# Patient Record
Sex: FEMALE | Race: WHITE | Employment: UNEMPLOYED | URBAN - METROPOLITAN AREA
[De-identification: names, ages, dates, MRNs, and addresses within clinical notes are randomized per-mention and may not be internally consistent; named-entity substitution may affect disease eponyms.]

---

## 2017-07-19 ENCOUNTER — OFFICE VISIT (OUTPATIENT)
Dept: URGENT CARE | Facility: URGENT CARE | Age: 32
End: 2017-07-19
Payer: COMMERCIAL

## 2017-07-19 VITALS
BODY MASS INDEX: 21.96 KG/M2 | TEMPERATURE: 97.6 F | OXYGEN SATURATION: 99 % | DIASTOLIC BLOOD PRESSURE: 70 MMHG | WEIGHT: 128 LBS | SYSTOLIC BLOOD PRESSURE: 105 MMHG | HEART RATE: 67 BPM

## 2017-07-19 DIAGNOSIS — L03.90 CELLULITIS, UNSPECIFIED CELLULITIS SITE: Primary | ICD-10-CM

## 2017-07-19 PROCEDURE — 99213 OFFICE O/P EST LOW 20 MIN: CPT | Performed by: FAMILY MEDICINE

## 2017-07-19 NOTE — MR AVS SNAPSHOT
"              After Visit Summary   7/19/2017    Linda Michel    MRN: [id number]           Patient Information     Date Of Birth          1985        Visit Information        Provider Department      7/19/2017 1:30 PM Jay Frazier MD [facility]        Today's Diagnoses     Cellulitis, unspecified cellulitis site    -  1       Follow-ups after your visit        Future tests that were ordered for you today     Open Future Orders        Priority Expected Expires Ordered    **Lyme Disease Perla with reflex to WB Serum FUTURE 14d Routine 7/26/2017 8/2/2017 7/19/2017            Who to contact     If you have questions or need follow up information about today's clinic visit or your schedule please contact [facility] directly at [phone].  Normal or non-critical lab and imaging results will be communicated to you by MyChart, letter or phone within 4 business days after the clinic has received the results. If you do not hear from us within 7 days, please contact the clinic through MyChart or phone. If you have a critical or abnormal lab result, we will notify you by phone as soon as possible.  Submit refill requests through [facility] or call your pharmacy and they will forward the refill request to us. Please allow 3 business days for your refill to be completed.          Additional Information About Your Visit        MyChart Information     H?REL lets you send messages to your doctor, view your test results, renew your prescriptions, schedule appointments and more. To sign up, go to [email or web address] . Click on \"Log in\" on the left side of the screen, which will take you to the Welcome page. Then click on \"Sign up Now\" on the right side of the page.     You will be asked to enter the access code listed below, as well as some personal information. Please follow the directions to create your username and password.     Your access code is: " [email or web address]  Expires: 10/17/2017  2:20 PM     Your access code will  in 90 days. If you need help or a new code, please call your [facility] or [phone].        Care EveryWhere ID     This is your Care EveryWhere ID. This could be used by other organizations to access your [place] medical records  [id number]        Your Vitals Were     Pulse Temperature Pulse Oximetry BMI (Body Mass Index)          67 97.6  F (36.4  C) (Oral) 99% 21.96 kg/m2         Blood Pressure from Last 3 Encounters:   17 105/70   08/10/16 (!) 140/98   16 (!) 145/100    Weight from Last 3 Encounters:   17 128 lb (58.1 kg)   16 124 lb 1.9 oz (56.3 kg)   16 125 lb (56.7 kg)                 Today's Medication Changes          These changes are accurate as of: 17  2:20 PM.  If you have any questions, ask your nurse or doctor.               Start taking these medicines.        Dose/Directions    amoxicillin-clavulanate 875-125 MG per tablet   Commonly known as:  AUGMENTIN   Used for:  Cellulitis, unspecified cellulitis site   Started by:  Jay Frazier MD        Dose:  1 tablet   Take 1 tablet by mouth 2 times daily   Quantity:  20 tablet   Refills:  0            Where to get your medicines      These medications were sent to [facility] [address] [place], [place] [address] AT [facility]   [address], [facility] [address]    Hours:  24-hours Phone:  [phone]     amoxicillin-clavulanate 875-125 MG per tablet                Primary Care Provider Office Phone # Fax #    [facility] [phone] [address] [facility] [address]        Equal Access to Services     TERRIE BEDOLLA AH: Hadii aad ku hadasho Soomaali, waaxda luqadaha, qaybta kaalmada rochelle, christoph ugalde ah. So [facility] [phone].    ATENCIÓN: Si habla español, tiene a esteban disposición servicios gratuitos de  asistencia lingüística. Chet al [phone].    We comply with applicable federal civil rights laws and [place] laws. We do not discriminate on the basis of race, color, national origin, age, disability sex, sexual orientation or gender identity.            Thank you!     Thank you for choosing [facility]  for your care. Our goal is always to provide you with excellent care. Hearing back from our patients is one way we can continue to improve our services. Please take a few minutes to complete the written survey that you may receive in the mail after your visit with us. Thank you!             Your Updated Medication List - Protect others around you: Learn how to safely use, store and throw away your medicines at www.disposemymeds.org.          This list is accurate as of: 7/19/17  2:20 PM.  Always use your most recent med list.                   Brand Name Dispense Instructions for use Diagnosis    amLODIPine 5 MG tablet    NORVASC    30 tablet    Take 1 tablet (5 mg) by mouth daily    Essential hypertension with goal blood pressure less than 130/85       amoxicillin-clavulanate 875-125 MG per tablet    AUGMENTIN    20 tablet    Take 1 tablet by mouth 2 times daily    Cellulitis, unspecified cellulitis site       disulfiram 250 MG tablet    ANTABUSE    30 tablet    Take 1 tablet (250 mg) by mouth daily    Alcohol abuse       folic acid 1 MG tablet    FOLVITE    90 tablet    Take 1 tablet (1 mg) by mouth daily    Chronic alcohol abuse       * multivitamin, therapeutic with minerals Tabs tablet     90 each    Take 1 tablet by mouth daily    Chronic alcohol abuse       * multivitamin, therapeutic with minerals Tabs tablet     30 each    Take 1 tablet by mouth daily    Alcohol abuse       ORTHO TRI-CYCLEN LO 0.18/0.215/0.25 MG-25 MCG per tablet   Generic drug:  norgestim-eth estrad triphasic      Take 1 tablet by mouth daily        thiamine 100 MG tablet     30 tablet    Take 1 tablet  (100 mg) by mouth daily    Alcohol abuse       * Notice:  This list has 2 medication(s) that are the same as other medications prescribed for you. Read the directions carefully, and ask your doctor or other care provider to review them with you.

## 2017-07-19 NOTE — NURSING NOTE
"Chief Complaint   Patient presents with     Insect Bite     spider bite on left thigh.       Initial /70  Pulse 67  Temp 97.6  F (36.4  C) (Oral)  Wt 128 lb (58.1 kg)  SpO2 99%  BMI 21.96 kg/m2 Estimated body mass index is 21.96 kg/(m^2) as calculated from the following:    Height as of 8/8/16: 5' 4.02\" (1.626 m).    Weight as of this encounter: 128 lb (58.1 kg).  Medication Reconciliation: complete    "

## 2017-07-19 NOTE — PROGRESS NOTES
SUBJECTIVE:  Linda Michel, a 31 year old female scheduled an appointment to discuss the following issues:  Cellulitis, unspecified cellulitis site; 4 days of increasing redness and  Pain . Central area on her lt thigh with possible spider bite. Does not remember tick.    No fever and no chills. No SOB    No joint pain    Medical, social, surgical, and family histories reviewed.    ROS:  CONSTITUTIONAL:as above  INTEGUMENTARY/SKIN: as above  E: NEGATIVE for vision changes   R: NEGATIVE for significant cough or SOB  CV: NEGATIVE for chest pain, palpitations   GI: NEGATIVE for nausea, abdominal pain, heartburn, or change in bowel habits  : NEGATIVE for frequency, dysuria, or hematuria  M: NEGATIVE for significant arthralgias or myalgia  N: NEGATIVE for weakness, dizziness or paresthesias or headache    OBJECTIVE:  /70  Pulse 67  Temp 97.6  F (36.4  C) (Oral)  Wt 128 lb (58.1 kg)  SpO2 99%  BMI 21.96 kg/m2  EXAM:  GENERAL APPEARANCE: healthy, active and no distress  EYES: EOMI,  PERRL    RESP: lungs clear to auscultation - no rales, rhonchi or wheezes    ABDOMEN:  soft, nontender, no HSM or masses and bowel sounds normal  SKIN: lt anterior thigh. Mid thigh there is rednessand swelling surrounding a central core . Diameter of about 10 cm. Some medial clearing like target lesion.  MS; normal  ASSESSMENT/PLAN:  (L03.90) Cellulitis, unspecified cellulitis site  (primary encounter diagnosis)  Comment:     Plan: amoxicillin-clavulanate (AUGMENTIN) 875-125 MG         per tablet, **Lyme Disease Perla with reflex to         WB Serum FUTURE 14d            I suspect insect bite :i.e spider Not thinking this is tick bite but there is some hint of a target sign.    There is no evidence of joint involvment and not systemically ill.  No arthralgias..     See orders    Septra for 10 days  2 weeks and will have Lymes titer.

## 2018-02-06 ENCOUNTER — HOSPITAL ENCOUNTER (EMERGENCY)
Facility: CLINIC | Age: 33
Discharge: HOME | End: 2018-02-06
Attending: EMERGENCY MEDICINE | Admitting: EMERGENCY MEDICINE
Payer: COMMERCIAL

## 2018-02-06 ENCOUNTER — APPOINTMENT (OUTPATIENT)
Dept: GENERAL RADIOLOGY | Facility: CLINIC | Age: 33
End: 2018-02-06
Attending: EMERGENCY MEDICINE
Payer: COMMERCIAL

## 2018-02-06 VITALS
OXYGEN SATURATION: 97 % | HEART RATE: 110 BPM | DIASTOLIC BLOOD PRESSURE: 104 MMHG | RESPIRATION RATE: 18 BRPM | SYSTOLIC BLOOD PRESSURE: 144 MMHG | TEMPERATURE: 98.1 F

## 2018-02-06 DIAGNOSIS — F10.220 ACUTE ALCOHOLIC INTOXICATION IN ALCOHOLISM WITHOUT COMPLICATION (H): ICD-10-CM

## 2018-02-06 DIAGNOSIS — F41.9 ANXIETY: ICD-10-CM

## 2018-02-06 LAB
ANION GAP SERPL CALCULATED.3IONS-SCNC: 8 MMOL/L (ref 3–14)
BUN SERPL-MCNC: 8 MG/DL (ref 7–30)
CALCIUM SERPL-MCNC: 8.2 MG/DL (ref 8.5–10.1)
CHLORIDE SERPL-SCNC: 109 MMOL/L (ref 94–109)
CO2 SERPL-SCNC: 28 MMOL/L (ref 20–32)
CREAT SERPL-MCNC: 0.54 MG/DL (ref 0.52–1.04)
ETHANOL SERPL-MCNC: 0.34 G/DL
GFR SERPL CREATININE-BSD FRML MDRD: >90 ML/MIN/1.7M2
GLUCOSE SERPL-MCNC: 95 MG/DL (ref 70–99)
INTERPRETATION ECG - MUSE: NORMAL
POTASSIUM SERPL-SCNC: 3.5 MMOL/L (ref 3.4–5.3)
SODIUM SERPL-SCNC: 145 MMOL/L (ref 133–144)

## 2018-02-06 PROCEDURE — 25000128 H RX IP 250 OP 636: Performed by: EMERGENCY MEDICINE

## 2018-02-06 PROCEDURE — 71046 X-RAY EXAM CHEST 2 VIEWS: CPT

## 2018-02-06 PROCEDURE — 72072 X-RAY EXAM THORAC SPINE 3VWS: CPT

## 2018-02-06 PROCEDURE — 80320 DRUG SCREEN QUANTALCOHOLS: CPT | Performed by: EMERGENCY MEDICINE

## 2018-02-06 PROCEDURE — 96360 HYDRATION IV INFUSION INIT: CPT

## 2018-02-06 PROCEDURE — 96361 HYDRATE IV INFUSION ADD-ON: CPT

## 2018-02-06 PROCEDURE — 99285 EMERGENCY DEPT VISIT HI MDM: CPT | Mod: 25

## 2018-02-06 PROCEDURE — 93005 ELECTROCARDIOGRAM TRACING: CPT

## 2018-02-06 PROCEDURE — 80048 BASIC METABOLIC PNL TOTAL CA: CPT | Performed by: EMERGENCY MEDICINE

## 2018-02-06 RX ORDER — SODIUM CHLORIDE 9 MG/ML
1000 INJECTION, SOLUTION INTRAVENOUS CONTINUOUS
Status: DISCONTINUED | OUTPATIENT
Start: 2018-02-06 | End: 2018-02-06 | Stop reason: HOSPADM

## 2018-02-06 RX ADMIN — SODIUM CHLORIDE 1000 ML: 9 INJECTION, SOLUTION INTRAVENOUS at 18:30

## 2018-02-06 ASSESSMENT — ENCOUNTER SYMPTOMS
DIARRHEA: 0
COUGH: 0
DIZZINESS: 1
SHORTNESS OF BREATH: 1

## 2018-02-06 NOTE — ED PROVIDER NOTES
History     Chief Complaint:  Alcohol Intoxication    The history is provided by the patient.      Linda Michel is a 32 year old female with a history of alcohol abuse and alcohol withdrawal seizures, sober for two years per patient, who EMS presents to the emergency department for evaluation of alcohol intoxication. The patient states that she had 2-3 shots of vodka and took 30mg of Remeron at 1400. She denies taking any medications as a suicide attempt--the Remeron was to help her sleep. The patient does endorse insomnia for the last week, but has been sleeping well otherwise. The patient presents today as she is worried that she is in danger of an alcohol-induced seizure; she notes having had 3 prior seizures brought on by 2-3 drinks in the past. On presentation, the patient endorses shortness of breath, anxiety, and intermittent lightheaded dizziness. She does note that she would like to leave the emergency department as soon as allowed.    Patient additionally notes upper back pain between her shoulders that began 2-3 days ago that she posits may be a result of working out. No trauma.  No numbness or tingling.  No history of IV drug use, blood thinners etc.  Patient additionally notes vomiting, but states that this is chronic. The patient is not on seizure medication. Patient denies diarrhea, chest pain, recent fall or trauma, cough, or other complaint.     Allergies:  No Known Drug Allergies     Medications:    Ortho Tri-Cyclen   Remeron  Amlodipine  Antabuse     Past Medical History:    Substance abuse  Alcohol Withdrawal seizures    Past Surgical History:    Right ACL repair     Family History:    Colon cancer  Depression  Alcoholism  Hypertension     Social History:  The patient presented to the ED alone.  Smoking Status: Never  Smokeless Tobacco: Never  Alcohol Use: Yes   Marital Status:  Single      Review of Systems   Respiratory: Positive for shortness of breath. Negative for cough.     Cardiovascular: Negative for chest pain.   Gastrointestinal: Negative for diarrhea.   Neurological: Positive for dizziness.   Psychiatric/Behavioral:        (+) anxiety   All other systems reviewed and are negative.      Physical Exam     Patient Vitals for the past 24 hrs:   BP Temp Temp src Pulse Heart Rate Resp SpO2   02/06/18 1948 (!) 144/104 - - - 88 - 97 %   02/06/18 1730 (!) 133/95 - - - - - -   02/06/18 1715 - - - - - - 94 %   02/06/18 1706 (!) 154/110 98.1  F (36.7  C) Oral 110 - 18 93 %        Physical Exam  Physical Exam   General: Resting on the bed.  Head: No obvious trauma to head.  Ears, Nose, Throat:  External ears normal.  Nose normal.  No pharyngeal erythema, swelling or exudate.  Midline uvula.    Eyes:  Conjunctivae clear.  Pupils are equal, round, and reactive.   Neck: Normal range of motion.  Neck supple.  non tender c spine  CV: Regular rate and rhythm.  No murmurs.      Respiratory: Effort normal and breath sounds normal.  No wheezing or crackles.   Gastrointestinal: Soft.  No distension. There is no tenderness.  Musculoskeletal: Normal range of motion. tender over upper thoracic spine in the midline and bilateral paraspinous muscles.  Full ROM.    Neuro: Alert. Moving all extremities appropriately.  Normal speech.  Gross muscle strength intact of the proximal and distal bilateral upper and lower extremities.  Sensation intact to light touch in all 4 extremities.  2+ patellar reflexes.  Normal gait.  No tremors.    Skin: Skin is warm and dry.  No rash noted.   Psych: Normal mood and affect. Behavior is normal.  anxious appearing.      Emergency Department Course   ECG (18:20:29):  Rate 103 bpm. IL interval 129. QRS duration 82. QT/QTc 248/455. P-R-T axes 65 90 51. Sinus tachycardia. Rightward axis. Borderline ECG. No significant change when compared to EKG dated 8/16/2016.  Interpreted at 1824 by Rosmery Schmidt MD.     Imaging:  Radiographic findings were communicated with the patient  who voiced understanding of the findings.    XR Chest, 2 views:  IMPRESSION: Nothing acute. Chronic granulomatous disease right lung.    XR Thoracic spine, 3 views:  IMPRESSION: Negative.    Imaging independently reviewed and agree with radiologist interpretation.      Laboratory:  BMP: Na 145 (H) o/w WNL (Creatinine 0.54)  EtOH: 0.34 (HH)    Interventions:  1830: NS 1L IV Bolus       Emergency Department Course:  Past medical records, nursing notes, and vitals reviewed.  1735: I performed an exam of the patient and obtained history, as documented above.     Above interventions provided.  IV inserted and blood drawn.  The patient was sent for an XR while in the emergency department, findings above.    2013: I rechecked the patient. Findings and plan explained to the Patient. Patient discharged home with instructions regarding supportive care, medications, and reasons to return. The importance of close follow-up was reviewed.      Impression & Plan      Medical Decision Making:  Linda Michel is a 32 year old female with a history of alcoholism who presents to the ED with alcohol abuse today. Vital signs reviewed. Mild hypertension and mild tachycardia improved on repeat. Broad differential pursued, including but not limited to electrolyte and metabolic abnormality, alcohol abuse, alcohol withdrawal/seizures, dehydration, anxiety, etc. Overall, patient is well appearing and nontoxic. She described some vague complaints including some upper-thoracic back pain. She reports that she lifts heavy weights. There is not trauma or fall. There is no peripheral neurologic deficit as well. X-rays are obtained showing negative thoracic spine with no fractures or malalignment. Chest x-ray is obtained with this vague shortness of breath which shows nothing acute, only her chronic granulomatous disease. EKG was a reassuring sign, no acute ST T wave changes. She has no cardiac risk factors, has no chest pain, does not appear  concerned for acute coronary syndrome at this time. It appears she is anxious thus her vague symptoms are likely related to her anxiety.  Patient agrees.  She was offered DEC but declined.  Alcohol level is 0.34. BMP shows no acute electrolyte, metabolic, or renal abnormality. She is given 1 liter of fluids an felt improved. At this time she has no fasiculations, no tremors, no persistent tachycardia/hypertension. I do not suspect withdrawals. She's also quite intoxicated. Patient was offered help with alcohol abuse and detox. She mostly came in because she is concerned that she may withdraw. At this time I do not see any active withdrawal. I have discussed with the patient that I cannot predict whether or not she will go through withdrawals, or whether or not she will have a seizure. once again offered detox as a preferred option. She was given strict return instructions. She was advised to follow very closely in the emergency department with any changes or to follow up with her primary doctor as described.       Diagnosis:    ICD-10-CM   1. Acute alcoholic intoxication in alcoholism without complication (H) F10.220   2. Anxiety F41.9       Disposition:  Discharged to home with plan as outlined.      I, Facundo Acosta, am serving as a scribe at 5:41 PM on 2/6/2018 to document services personally performed by Rosmery Schmidt MD based on my observations and the provider's statements to me.    2/6/2018    EMERGENCY DEPARTMENT     Rosmery Schmidt MD  02/07/18 1569       Rosmery Schmidt MD  02/07/18 0658

## 2018-02-06 NOTE — ED AVS SNAPSHOT
Emergency Department    [address] [place] [address]    Phone:  [phone]    Fax:  [phone]                                       Linda Michel   MRN: [id number]    Department:   Emergency Department   Date of Visit:  2/6/2018           After Visit Summary Signature Page     I have received my discharge instructions, and my questions have been answered. I have discussed any challenges I see with this plan with the nurse or doctor.    ..........................................................................................................................................  Patient/Patient Representative Signature      ..........................................................................................................................................  Patient Representative Print Name and Relationship to Patient    ..................................................               ................................................  Date                                            Time    ..........................................................................................................................................  Reviewed by Signature/Title    ...................................................              ..............................................  Date                                                            Time

## 2018-02-06 NOTE — ED AVS SNAPSHOT
Emergency Department    [address] [facility] [address]    Phone:  [phone]    Fax:  [phone]                                       Linda Michel   MRN: [id number]    Department:   Emergency Department   Date of Visit:  2/6/2018           Patient Information     Date Of Birth          1985        Your diagnoses for this visit were:     Acute alcoholic intoxication in alcoholism without complication (H)     Anxiety        You were seen by Rosmery Schmidt MD.      Follow-up Information     Follow up with Clinic, [facility]. Schedule an appointment as soon as possible for a visit in 3 days.    Contact information:    [address] [id number] [phone]          Discharge Instructions       Please stop drinking alcohol.  This is harmful to your health.  Return to the ED if you want help with your alcohol use.  Follow up with your PCP in 1 week.        Discharge Instructions  Alcohol Intoxication    You have been seen today with alcohol intoxication. This means that you have enough alcohol in your system to impair your ability to mentally and physically function, perhaps to the extent that you were unable to care for yourself.    Generally, every Emergency Department visit should have a follow-up clinic visit with either a primary or a specialty clinic/provider. Please follow-up as instructed by your emergency provider today.    You may have come to the Emergency Department because of your intoxication, or for another reason, such as because of an injury. No matter what the case is, this visit is a  red flag  regarding alcohol use, and you should consider whether your drinking pattern is a problem for you.     You may be at risk for alcohol-related problems if:      Men: you drink more than 14 drinks per week, or more than 4 drinks per occasion.      Women: you drink more than 7 drinks per week or more than 3 drinks per  occasion.      You have black-outs.    You do things you regret while drinking.    You have legal problems because of drinking.    You have job problems because of drinking (you call in sick to work because of drinking).    CAGE Questions    Have you ever felt you should cut down on your drinking?    Have people annoyed you by criticizing your drinking?    Have you ever felt bad or guilty about your drinking?    Have you ever had a drink first thing in the morning to steady your nerves or get rid of a hangover (eye opener)?    If you answer yes to any of the CAGE questions, you may have a problem with alcohol.      Return to the Emergency Department if:    You become shaky or tremble when you try to stop drinking.     You have severe abdominal pain (belly pain).     You have a seizure or pass out.      You vomit (throw up) blood or have blood in your stool. This may be bright red or it may look like black coffee grounds.    You become lightheaded or faint.      For further help, contact:     Your caregiver.      Alcoholics Anonymous (AA).    o [facility]: [phone]  o [place] [facility] Central Office: [phone]     A drug or alcohol rehabilitation program.      You can get information on alcohol resources and groups by calling the number 211 or [phone] on any phone.     Seek medical care if:    You have persistent vomiting.     You have persistent pain in any part of your body.      You do not feel better after a few days.    If you were given a prescription for medicine here today, be sure to read all of the information (including the package insert) that comes with your prescription.  This will include important information about the medicine, its side effects, and any warnings that you need to know about.  The pharmacist who fills the prescription can provide more information and answer questions you may have about the medicine.  If you have questions or concerns that  the pharmacist cannot address, please call or return to the Emergency Department.   Remember that you can always come back to the Emergency Department if you are not able to see your regular doctor in the amount of time listed above, if you get any new symptoms, or if there is anything that worries you.;l    24 Hour Appointment Hotline       To make an appointment at any [facility], call [phone] ([phone]). If you don't have a family doctor or clinic, we will help you find one. [place] clinics are conveniently located to serve the needs of you and your family.             Review of your medicines      Our records show that you are taking the medicines listed below. If these are incorrect, please call your family doctor or clinic.        Dose / Directions Last dose taken    amLODIPine 5 MG tablet   Commonly known as:  NORVASC   Dose:  5 mg   Quantity:  30 tablet        Take 1 tablet (5 mg) by mouth daily   Refills:  1        amoxicillin-clavulanate 875-125 MG per tablet   Commonly known as:  AUGMENTIN   Dose:  1 tablet   Quantity:  20 tablet        Take 1 tablet by mouth 2 times daily   Refills:  0        disulfiram 250 MG tablet   Commonly known as:  ANTABUSE   Dose:  250 mg   Quantity:  30 tablet        Take 1 tablet (250 mg) by mouth daily   Refills:  0        folic acid 1 MG tablet   Commonly known as:  FOLVITE   Dose:  1 mg   Quantity:  90 tablet        Take 1 tablet (1 mg) by mouth daily   Refills:  2        * multivitamin, therapeutic with minerals Tabs tablet   Dose:  1 tablet   Quantity:  90 each        Take 1 tablet by mouth daily   Refills:  2        * multivitamin, therapeutic with minerals Tabs tablet   Dose:  1 tablet   Quantity:  30 each        Take 1 tablet by mouth daily   Refills:  0        ORTHO TRI-CYCLEN LO 0.18/0.215/0.25 MG-25 MCG per tablet   Dose:  1 tablet   Generic drug:  norgestim-eth estrad triphasic        Take 1 tablet by mouth daily   Refills:  0        thiamine 100  MG tablet   Dose:  100 mg   Quantity:  30 tablet        Take 1 tablet (100 mg) by mouth daily   Refills:  0        * Notice:  This list has 2 medication(s) that are the same as other medications prescribed for you. Read the directions carefully, and ask your doctor or other care provider to review them with you.            Procedures and tests performed during your visit     Alcohol level blood    Basic metabolic panel    EKG 12-lead, tracing only    XR Chest 2 Views    XR Thoracic Spine 3 Views      Orders Needing Specimen Collection     None      Pending Results     No orders found from 2/4/2018 to 2/7/2018.            Pending Culture Results     No orders found from 2/4/2018 to 2/7/2018.            Pending Results Instructions     If you had any lab results that were not finalized at the time of your Discharge, you can call the ED Lab Result RN at [phone]. You will be contacted by this team for any positive Lab results or changes in treatment. The nurses are available 7 days a week from 10A to 6:30P.  You can leave a message 24 hours per day and they will return your call.        Test Results From Your Hospital Stay        2/6/2018  7:10 PM      Component Results     Component Value Ref Range & Units Status    Sodium 145 (H) 133 - 144 mmol/L Final    Potassium 3.5 3.4 - 5.3 mmol/L Final    Chloride 109 94 - 109 mmol/L Final    Carbon Dioxide 28 20 - 32 mmol/L Final    Anion Gap 8 3 - 14 mmol/L Final    Glucose 95 70 - 99 mg/dL Final    Urea Nitrogen 8 7 - 30 mg/dL Final    Creatinine 0.54 0.52 - 1.04 mg/dL Final    GFR Estimate >90 >60 mL/min/1.7m2 Final    Non  GFR Calc    GFR Estimate If Black >90 >60 mL/min/1.7m2 Final    African American GFR Calc    Calcium 8.2 (L) 8.5 - 10.1 mg/dL Final         2/6/2018  7:20 PM      Component Results     Component Value Ref Range & Units Status    Ethanol g/dL 0.34 (HH) <0.01 g/dL Final    Critical Value called to and read back by   DR DAVIDSON ON ED  AT 1915 MA           2/6/2018  7:21 PM      Narrative     CHEST TWO VIEWS   2/6/2018 6:49 PM     HISTORY: Shortness of breath.      COMPARISON: None.    FINDINGS: Calcified granuloma right lung laterally with calcified  right hilar lymph nodes. This should be old granulomatous disease.  Left lung is clear.        Impression     IMPRESSION: Nothing acute. Chronic granulomatous disease right lung.    IVONNE BABCOCK MD         2/6/2018  7:21 PM      Narrative     THORACIC SPINE THREE VIEWS   2/6/2018 6:50 PM     HISTORY: Pain spine.    COMPARISON: None.    FINDINGS: Negative thoracic spine. Normal alignment. No fractures.        Impression     IMPRESSION: Negative.    IVONNE BABCOCK MD                Clinical Quality Measure: Blood Pressure Screening     Your blood pressure was checked while you were in the emergency department today. The last reading we obtained was  BP: (!) 144/104 . Please read the guidelines below about what these numbers mean and what you should do about them.  If your systolic blood pressure (the top number) is less than 120 and your diastolic blood pressure (the bottom number) is less than 80, then your blood pressure is normal. There is nothing more that you need to do about it.  If your systolic blood pressure (the top number) is 120-139 or your diastolic blood pressure (the bottom number) is 80-89, your blood pressure may be higher than it should be. You should have your blood pressure rechecked within a year by a primary care provider.  If your systolic blood pressure (the top number) is 140 or greater or your diastolic blood pressure (the bottom number) is 90 or greater, you may have high blood pressure. High blood pressure is treatable, but if left untreated over time it can put you at risk for heart attack, stroke, or kidney failure. You should have your blood pressure rechecked by a primary care provider within the next 4 weeks.  If your provider in the emergency department today  "gave you specific instructions to follow-up with your doctor or provider even sooner than that, you should follow that instruction and not wait for up to 4 weeks for your follow-up visit.        Thank you for choosing [facility]       Thank you for choosing [facility] for your care. Our goal is always to provide you with excellent care. Hearing back from our patients is one way we can continue to improve our services. Please take a few minutes to complete the written survey that you may receive in the mail after you visit with us. Thank you!        [email or web address] Information     [email or web address] lets you send messages to your doctor, view your test results, renew your prescriptions, schedule appointments and more. To sign up, go to [email or web address] . Click on \"Log in\" on the left side of the screen, which will take you to the Welcome page. Then click on \"Sign up Now\" on the right side of the page.     You will be asked to enter the access code listed below, as well as some personal information. Please follow the directions to create your username and password.     Your access code is: [email or web address]  Expires: 2018  8:24 PM     Your access code will  in 90 days. If you need help or a new code, please call your [facility] clinic or [phone].        Care EveryWhere ID     This is your Care EveryWhere ID. This could be used by other organizations to access your [facility] medical records  [id number]        Equal Access to Services     RASHEL [facility] : Hadii anyi Lundberg, waaxda belkys, qaybta arjunalchristoph dallas . So [facility] [phone].    ATENCIÓN: Si habla español, tiene a esteban disposición servicios gratuitos de asistencia lingüística. Llame al [phone].    We comply with applicable federal civil rights laws and [place] laws. We do not discriminate on the basis of race, color, national origin, age, disability, sex, sexual orientation, or gender identity.          "   After Visit Summary       This is your record. Keep this with you and show to your community pharmacist(s) and doctor(s) at your next visit.

## 2018-02-06 NOTE — ED NOTES
Bed: ED19  Expected date:   Expected time:   Means of arrival:   Comments:  EMS - etoh intoxication

## 2018-02-07 NOTE — DISCHARGE INSTRUCTIONS
Please stop drinking alcohol.  This is harmful to your health.  Return to the ED if you want help with your alcohol use.  Follow up with your PCP in 1 week.        Discharge Instructions  Alcohol Intoxication    You have been seen today with alcohol intoxication. This means that you have enough alcohol in your system to impair your ability to mentally and physically function, perhaps to the extent that you were unable to care for yourself.    Generally, every Emergency Department visit should have a follow-up clinic visit with either a primary or a specialty clinic/provider. Please follow-up as instructed by your emergency provider today.    You may have come to the Emergency Department because of your intoxication, or for another reason, such as because of an injury. No matter what the case is, this visit is a  red flag  regarding alcohol use, and you should consider whether your drinking pattern is a problem for you.     You may be at risk for alcohol-related problems if:      Men: you drink more than 14 drinks per week, or more than 4 drinks per occasion.      Women: you drink more than 7 drinks per week or more than 3 drinks per occasion.      You have black-outs.    You do things you regret while drinking.    You have legal problems because of drinking.    You have job problems because of drinking (you call in sick to work because of drinking).    CAGE Questions    Have you ever felt you should cut down on your drinking?    Have people annoyed you by criticizing your drinking?    Have you ever felt bad or guilty about your drinking?    Have you ever had a drink first thing in the morning to steady your nerves or get rid of a hangover (eye opener)?    If you answer yes to any of the CAGE questions, you may have a problem with alcohol.      Return to the Emergency Department if:    You become shaky or tremble when you try to stop drinking.     You have severe abdominal pain (belly pain).     You have a seizure or  pass out.      You vomit (throw up) blood or have blood in your stool. This may be bright red or it may look like black coffee grounds.    You become lightheaded or faint.      For further help, contact:     Your caregiver.      Alcoholics Anonymous (AA).    o [facility] Intergroup: [phone]  o [facility] Central Office: [phone]     A drug or alcohol rehabilitation program.      You can get information on alcohol resources and groups by calling the number 211 or [phone] on any phone.     Seek medical care if:    You have persistent vomiting.     You have persistent pain in any part of your body.      You do not feel better after a few days.    If you were given a prescription for medicine here today, be sure to read all of the information (including the package insert) that comes with your prescription.  This will include important information about the medicine, its side effects, and any warnings that you need to know about.  The pharmacist who fills the prescription can provide more information and answer questions you may have about the medicine.  If you have questions or concerns that the pharmacist cannot address, please call or return to the Emergency Department.   Remember that you can always come back to the Emergency Department if you are not able to see your regular doctor in the amount of time listed above, if you get any new symptoms, or if there is anything that worries you.;l